# Patient Record
Sex: FEMALE | Race: ASIAN | URBAN - METROPOLITAN AREA
[De-identification: names, ages, dates, MRNs, and addresses within clinical notes are randomized per-mention and may not be internally consistent; named-entity substitution may affect disease eponyms.]

---

## 2021-11-01 ENCOUNTER — OFFICE VISIT (OUTPATIENT)
Dept: URBAN - METROPOLITAN AREA CLINIC 90 | Facility: CLINIC | Age: 31
End: 2021-11-01
Payer: COMMERCIAL

## 2021-11-01 PROCEDURE — 99204 OFFICE O/P NEW MOD 45 MIN: CPT | Performed by: OPHTHALMOLOGY

## 2021-11-01 ASSESSMENT — INTRAOCULAR PRESSURE
OS: 18
OD: 18

## 2021-11-01 NOTE — IMPRESSION/PLAN
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 Plan: No Left eye involvement at this time. HSV dermatitis of the upper lid and upper bridge of nose. Patient is to continue valtrex PO and to contact the office if rashes travel lower or if the Left eye becomes red with discharge.

## 2021-11-05 ENCOUNTER — OFFICE VISIT (OUTPATIENT)
Dept: URBAN - METROPOLITAN AREA CLINIC 91 | Facility: CLINIC | Age: 31
End: 2021-11-05
Payer: COMMERCIAL

## 2021-11-05 DIAGNOSIS — B00.59 HERPESVIRAL DERMATITIS OF EYELID: Primary | ICD-10-CM

## 2021-11-05 PROCEDURE — 99213 OFFICE O/P EST LOW 20 MIN: CPT | Performed by: OPHTHALMOLOGY

## 2021-11-05 ASSESSMENT — INTRAOCULAR PRESSURE
OD: 14
OS: 15

## 2022-01-21 ENCOUNTER — OFFICE VISIT (OUTPATIENT)
Dept: URBAN - METROPOLITAN AREA CLINIC 91 | Facility: CLINIC | Age: 32
End: 2022-01-21
Payer: COMMERCIAL

## 2022-01-21 DIAGNOSIS — H10.021 OTHER MUCOPURULENT CONJUNCTIVITIS, RIGHT EYE: Primary | ICD-10-CM

## 2022-01-21 PROCEDURE — 99214 OFFICE O/P EST MOD 30 MIN: CPT | Performed by: OPHTHALMOLOGY

## 2022-01-21 RX ORDER — NEOMYCIN SULFATE, POLYMYXIN B SULFATE AND DEXAMETHASONE 3.5; 10000; 1 MG/ML; [USP'U]/ML; MG/ML
SUSPENSION OPHTHALMIC
Qty: 5 | Refills: 1 | Status: ACTIVE
Start: 2022-01-21

## 2022-01-21 RX ORDER — CYCLOSPORINE 0.5 MG/ML
0.05 % EMULSION OPHTHALMIC
Qty: 180 | Refills: 4 | Status: ACTIVE
Start: 2022-01-21

## 2022-01-21 ASSESSMENT — INTRAOCULAR PRESSURE
OD: 15
OS: 14

## 2022-01-21 NOTE — IMPRESSION/PLAN
Impression: Other mucopurulent conjunctivitis, right eye: H10.021. Plan: For conjunctivitis, I have stressed the contagious nature of the infection, and patient should wash their hands frequently in order to avoid spreading to others. I have also given patient Rx for topical antibiotic to use as directed. 

maxitrol TID OD

## 2022-01-21 NOTE — IMPRESSION/PLAN
Impression: Dry eye syndrome of bilateral lacrimal glands: H04.123. Plan: For dry eye syndrome, I have recommended patient use a good quality artificial tear 4-6 times per day. I have also discussed alternatives such as placement of punctal plugs.  Patient is also instructed to start Restasis BID OU

## 2022-01-28 ENCOUNTER — OFFICE VISIT (OUTPATIENT)
Dept: URBAN - METROPOLITAN AREA CLINIC 91 | Facility: CLINIC | Age: 32
End: 2022-01-28
Payer: COMMERCIAL

## 2022-01-28 DIAGNOSIS — H04.123 DRY EYE SYNDROME OF BILATERAL LACRIMAL GLANDS: ICD-10-CM

## 2022-01-28 PROCEDURE — 99213 OFFICE O/P EST LOW 20 MIN: CPT | Performed by: OPHTHALMOLOGY

## 2022-01-28 ASSESSMENT — INTRAOCULAR PRESSURE
OS: 13
OD: 12

## 2022-01-28 NOTE — IMPRESSION/PLAN
Impression: Other mucopurulent conjunctivitis, right eye: H10.021. Plan: Conjunctivitis resolved. D/C Maxitrol.

## 2022-06-24 ENCOUNTER — OFFICE VISIT (OUTPATIENT)
Dept: URBAN - METROPOLITAN AREA CLINIC 91 | Facility: CLINIC | Age: 32
End: 2022-06-24
Payer: COMMERCIAL

## 2022-06-24 DIAGNOSIS — H16.143 PUNCTATE KERATITIS, BILATERAL: Primary | ICD-10-CM

## 2022-06-24 PROCEDURE — 99213 OFFICE O/P EST LOW 20 MIN: CPT | Performed by: OPHTHALMOLOGY

## 2022-06-24 RX ORDER — OFLOXACIN 3 MG/ML
0.3 % SOLUTION/ DROPS OPHTHALMIC
Qty: 5 | Refills: 0 | Status: ACTIVE
Start: 2022-06-24

## 2022-06-24 ASSESSMENT — INTRAOCULAR PRESSURE
OD: 19
OD: 23
OS: 14
OS: 15

## 2022-06-24 NOTE — IMPRESSION/PLAN
Impression: Punctate keratitis, bilateral: H16.143. Plan: Due to Diffuse Punctate Keratitis OU recommend to start Ofloxacin gtts TID OU x1 week. Recommend Refresh gel QHS OTC OU. Will continue to observe.

## 2022-08-19 ENCOUNTER — OFFICE VISIT (OUTPATIENT)
Dept: URBAN - METROPOLITAN AREA CLINIC 91 | Facility: CLINIC | Age: 32
End: 2022-08-19
Payer: COMMERCIAL

## 2022-08-19 DIAGNOSIS — H04.123 DRY EYE SYNDROME OF BILATERAL LACRIMAL GLANDS: ICD-10-CM

## 2022-08-19 DIAGNOSIS — H43.391 OTHER VITREOUS OPACITIES, RIGHT EYE: Primary | ICD-10-CM

## 2022-08-19 PROCEDURE — 99214 OFFICE O/P EST MOD 30 MIN: CPT | Performed by: OPHTHALMOLOGY

## 2022-08-19 ASSESSMENT — INTRAOCULAR PRESSURE
OS: 15
OD: 17

## 2022-08-19 NOTE — IMPRESSION/PLAN
Impression: Other vitreous opacities, right eye: H43.384. Plan: For vitreous floaters, I have explained the potential for developing a retinal tear or detachment. Patient was advised to return ASAP if
they notice an increase or change in floaters, a change or decrease of vision or a curtain/veil coming over vision. I explained the risk of
vision loss, and possible need for retinal detachment repair in detail. Patient was also provided an informational pamphlet.

## 2022-08-19 NOTE — IMPRESSION/PLAN
Impression: Dry eye syndrome of bilateral lacrimal glands: H04.123. Plan: For dry eye syndrome, I have recommended patient use a good quality artificial tear 4-6 times per day. Recommend using Lumify if desired.